# Patient Record
Sex: FEMALE
[De-identification: names, ages, dates, MRNs, and addresses within clinical notes are randomized per-mention and may not be internally consistent; named-entity substitution may affect disease eponyms.]

---

## 2023-10-03 ENCOUNTER — HOSPITAL ENCOUNTER (INPATIENT)
Dept: HOSPITAL 97 - ER | Age: 23
LOS: 5 days | Discharge: HOME | DRG: 386 | End: 2023-10-08
Attending: HOSPITALIST | Admitting: HOSPITALIST
Payer: COMMERCIAL

## 2023-10-03 VITALS — BODY MASS INDEX: 20.6 KG/M2

## 2023-10-03 DIAGNOSIS — D50.9: ICD-10-CM

## 2023-10-03 DIAGNOSIS — J45.909: ICD-10-CM

## 2023-10-03 DIAGNOSIS — G89.29: ICD-10-CM

## 2023-10-03 DIAGNOSIS — K51.511: Primary | ICD-10-CM

## 2023-10-03 DIAGNOSIS — M19.90: ICD-10-CM

## 2023-10-03 DIAGNOSIS — D62: ICD-10-CM

## 2023-10-03 DIAGNOSIS — K25.9: ICD-10-CM

## 2023-10-03 DIAGNOSIS — T39.395A: ICD-10-CM

## 2023-10-03 DIAGNOSIS — K44.9: ICD-10-CM

## 2023-10-03 DIAGNOSIS — E87.1: ICD-10-CM

## 2023-10-03 DIAGNOSIS — M06.9: ICD-10-CM

## 2023-10-03 DIAGNOSIS — M54.50: ICD-10-CM

## 2023-10-03 DIAGNOSIS — Z79.899: ICD-10-CM

## 2023-10-03 DIAGNOSIS — Z88.1: ICD-10-CM

## 2023-10-03 LAB
ALBUMIN SERPL BCP-MCNC: 3.2 G/DL (ref 3.4–5)
ALP SERPL-CCNC: 75 U/L (ref 45–117)
ALT SERPL W P-5'-P-CCNC: 18 U/L (ref 13–56)
AST SERPL W P-5'-P-CCNC: 10 U/L (ref 15–37)
BUN BLD-MCNC: 9 MG/DL (ref 7–18)
GLUCOSE SERPLBLD-MCNC: 90 MG/DL (ref 74–106)
HCT VFR BLD CALC: 33.8 % (ref 36–45)
LYMPHOCYTES # SPEC AUTO: 2 K/UL (ref 0.7–4.9)
MCV RBC: 79.9 FL (ref 80–100)
PMV BLD: 7.1 FL (ref 7.6–11.3)
POTASSIUM SERPL-SCNC: 4 MEQ/L (ref 3.5–5.1)
RBC # BLD: 4.24 M/UL (ref 3.86–4.86)
WBC # BLD AUTO: 14.4 THOU/UL (ref 4.3–10.9)

## 2023-10-03 PROCEDURE — 84100 ASSAY OF PHOSPHORUS: CPT

## 2023-10-03 PROCEDURE — 80053 COMPREHEN METABOLIC PANEL: CPT

## 2023-10-03 PROCEDURE — 83735 ASSAY OF MAGNESIUM: CPT

## 2023-10-03 PROCEDURE — 74250 X-RAY XM SM INT 1CNTRST STD: CPT

## 2023-10-03 PROCEDURE — 36415 COLL VENOUS BLD VENIPUNCTURE: CPT

## 2023-10-03 PROCEDURE — 81025 URINE PREGNANCY TEST: CPT

## 2023-10-03 PROCEDURE — 74177 CT ABD & PELVIS W/CONTRAST: CPT

## 2023-10-03 PROCEDURE — 80048 BASIC METABOLIC PNL TOTAL CA: CPT

## 2023-10-03 PROCEDURE — 84703 CHORIONIC GONADOTROPIN ASSAY: CPT

## 2023-10-03 PROCEDURE — 85025 COMPLETE CBC W/AUTO DIFF WBC: CPT

## 2023-10-03 PROCEDURE — 83690 ASSAY OF LIPASE: CPT

## 2023-10-03 PROCEDURE — 99285 EMERGENCY DEPT VISIT HI MDM: CPT

## 2023-10-03 PROCEDURE — 81001 URINALYSIS AUTO W/SCOPE: CPT

## 2023-10-03 NOTE — P.HP
Certification for Inpatient


Patient admitted to: Inpatient


With expected LOS: <2 Midnights


Patient will require the following post-hospital care: None


Practitioner: I am a practitioner with admitting privileges, knowledge of 

patient current condition, hospital course, and medical plan of care.


Services: Services provided to patient in accordance with Admission requirements

found in Title 42 Section 412.3 of the Code of Federal Regulations





Patient History


Date of Service: 10/03/23


Reason for admission: GI bleeding


History of Present Illness: 





23-year-old  female with a past medical history of rheumatoid arthritis,

osteoarthritis presents to the emergency room with rectal bleeding.  She reports

epigastric and lower quadrant abdominal tenderness.  That started 7 days ago.  

She reports stopping NSAID 4 days ago.  She reports associated bright red blood 

with each bowel movement over the last 3 days.  Reports mild nausea, low-grade 

fever today.  She denies chills, dysuria, vomiting,hematemesis,   She reported 

referred to the emergency room by her primary care doctor.   She reports taking 

meloxicam 15 mg, hydroxychloroquine. plan to admit for pancolitis, rectal 

bleeding.  Leukocytosis, rheumatoid arthritis, with GI consult.  Case was 

discussed with Dr. garzon.  Laboratory evaluation leukocytosis WBCs 14.40, no 

left shift, microcytic anemia hemoglobin 11.3, 33.8, platelets elevated at 410, 

mild hyponatremia 134, UA negative.  CT of the abdomen pelvis IMPRESSION: 

Moderately severe diffuse pancolitis is present. Although this appearance is 

nonspecific on CT, this may be inflammatory or infectious, ulcerative colitis is

a possibility.








Allergies





No Known Drug Allergies Allergy (Unverified 07/24/15 23:55)


   Unknown








- Past Medical/Surgical History


-: Rheumatoid arthritis


-: Osteoarthritis





- Social History


Smoking Status: Never smoker


Alcohol use: No


CD- Drugs: No


Caffeine use: No


Place of Residence: Home





Review of Systems


10-point ROS is otherwise unremarkable





Physical Examination





- Physical Exam


General: Alert, In no apparent distress, Oriented x3


HEENT: Atraumatic, Normocephalic, PERRLA


Neck: Supple, 2+ carotid pulse no bruit, JVD not distended


Respiratory: Clear to auscultation bilaterally, Normal air movement, Diminished


Cardiovascular: No edema, Normal pulses, Regular rate/rhythm


Capillary refill: <2 Seconds


Gastrointestinal: Normal bowel sounds, Other (Epigastric tenderness, lower 

abdominal tenderness, GI bleeding), Tenderness


Musculoskeletal: No clubbing, No swelling


Integumentary: No rashes, No breakdown


Neurological: Normal gait, Normal speech, Normal strength at 5/5 x4 extr





- Studies


Laboratory Data (last 24 hrs)











  10/03/23 10/03/23 10/03/23





  15:05 15:05 14:56


 


WBC   14.40 H 


 


Hgb   11.3 L 


 


Hct   33.8 L 


 


Plt Count   410 H 


 


Sodium  134 L  


 


Potassium  4.0  


 


BUN  9  


 


Creatinine  0.63  


 


Glucose  90  


 


Total Bilirubin  0.2  


 


AST  10 L  


 


ALT  18  


 


Alkaline Phosphatase  75  


 


Lipase    22











Assessment and Plan





- Plan


assessment plan


Pancolitis secondary to NSAID use


Gastric bleeding secondary to NSAID use versus pancolitis


Leukocytosis


Mild hyponatremia


Rheumatoid arthritis


Osteoarthritis


Microcytic anemia





assessment plan


Pancolitis secondary to NSAID use


Gastric bleeding secondary to NSAID use versus pancolitis


Leukocytosis


She reports starting new medication from rheumatology for RA. taking meloxicam 

15 mg, hydroxychloroquine. 


GI consult.  IV Flagyl, Cipro, as needed antiemetics, as needed analgesics


leukocytosis WBCs 14.40, no left shift, microcytic anemia hemoglobin 11.3, 33.8,

platelets elevated at 410, 


CT of the abdomen pelvis IMPRESSION: Moderately severe diffuse pancolitis is 

present. Although this appearance is nonspecific on CT, this may be inflammatory

or infectious, ulcerative colitis is a possibility.





Mild hyponatremia


mild hyponatremia 134, UA negative, IV fluids





Rheumatoid arthritis


Osteoarthritis


Microcytic anemia


Trend H&H, as needed analgesics, avoid NSAID use





Diet clear liquids


Full code


DVT SCDs.  


Discharge Plan: Home


Plan to discharge in: 48 Hours





- Advance Directives


Does patient have a Living Will: No


Does patient have a Durable POA for Healthcare: No





- Code Status/Comfort Care


Code Status: Full Code


Physician Review: Patient Assessed, Agree with Above Assessment and Plan


Critical Care: No


Time Spent Managing Pts Care (In Minutes): 50

## 2023-10-03 NOTE — P.HP
Certification for Inpatient


Patient admitted to: Inpatient


With expected LOS: <2 Midnights


Patient will require the following post-hospital care: None


Practitioner: I am a practitioner with admitting privileges, knowledge of 

patient current condition, hospital course, and medical plan of care.


Services: Services provided to patient in accordance with Admission requirements

found in Title 42 Section 412.3 of the Code of Federal Regulations





Patient History


Date of Service: 10/03/23


History of Present Illness: 





23-year-old  female with a past medical history of rheumatoid arthritis,

osteoarthritis presents to the emergency room with rectal bleeding.  She reports

lower quadrant abdominal pain that started 7 days ago.  She reports associated 

bright red blood with each bowel movement.  She reports starting new medication 

from rheumatology for RA.  She reports taking meloxicam 15 mg, 

hydroxychloroquine.  She denies fever, nausea vomiting,hematemesis, plan to 

admit for pancolitis, rectal bleeding.  Leukocytosis, rheumatoid arthritis, with

GI consult.  Case was discussed with Dr. garzon.  Laboratory evaluation 

leukocytosis WBCs 14.40, no left shift, microcytic anemia hemoglobin 11.3, 33.8,

platelets elevated at 410, mild hyponatremia 134, UA negative.  CT of the 

abdomen pelvis IMPRESSION: Moderately severe diffuse pancolitis is present. 

Although this appearance is nonspecific on CT, this may be inflammatory or 

infectious, ulcerative colitis is a possibility.








Allergies





No Known Drug Allergies Allergy (Unverified 07/24/15 23:55)


   Unknown








- Past Medical/Surgical History


-: Rheumatoid arthritis


-: Osteoarthritis





Physical Examination





- Studies


Laboratory Data (last 24 hrs)











  10/03/23 10/03/23 10/03/23





  16:45 16:12 16:12


 


WBC   


 


RBC   


 


Hgb   


 


Hct   


 


MCV   


 


MCH   


 


MCHC   


 


RDW   


 


Plt Count   


 


MPV   


 


Neutrophils %   


 


Lymphocytes %   


 


Monocytes %   


 


Eosinophils %   


 


Basophils %   


 


Absolute Neutrophils   


 


Absolute Lymphocytes   


 


Absolute Monocytes   


 


Absolute Eosinophils   


 


Absolute Basophils   


 


Sodium   


 


Potassium   


 


Chloride   


 


Carbon Dioxide   


 


Anion Gap   


 


BUN   


 


Creatinine   


 


Est GFR (CKD-EPI)   


 


Glucose   


 


Calcium   


 


Total Bilirubin   


 


AST   


 


ALT   


 


Alkaline Phosphatase   


 


Serum Total Protein   


 


Albumin   


 


Globulin   


 


Albumin/Globulin Ratio   


 


Lipase   


 


Serum Pregnancy, Qual  Neg  


 


Urine Color    Colorless


 


Urine Clarity    Turbid H


 


Urine pH    6.0


 


Ur Specific Gravity   < 1.005 L  < 1.005 L


 


Glucose (UA)(Auto)    Negative


 


Urine Ketones    Trace H


 


Urine Blood    Negative


 


Urine Nitrite    Negative


 


Urine Bilirubin    Negative


 


Urine Urobilinogen    Normal


 


Ur Leukocyte Esterase    Negative


 


Urine RBC    <5


 


Urine WBC    <5


 


Ur Squamous Epith Cells    <5


 


Unidentified Crystals    Few


 


Urine Bacteria    <20


 


Urine Culture Reflexed    Not needed


 


Urine Total Protein    Negative


 


Urine Pregnancy Test   Neg 














  10/03/23 10/03/23 10/03/23





  15:05 15:05 14:56


 


WBC   14.40 H 


 


RBC   4.24 


 


Hgb   11.3 L 


 


Hct   33.8 L 


 


MCV   79.9 L 


 


MCH   26.8 L 


 


MCHC   33.5 


 


RDW   14.2 


 


Plt Count   410 H 


 


MPV   7.1 L 


 


Neutrophils %   71.5 


 


Lymphocytes %   14.3 L 


 


Monocytes %   12.3 


 


Eosinophils %   1.7 


 


Basophils %   0.2 


 


Absolute Neutrophils   10.3 H 


 


Absolute Lymphocytes   2.0 


 


Absolute Monocytes   1.8 H 


 


Absolute Eosinophils   0.2 


 


Absolute Basophils   0.0 


 


Sodium  134 L  


 


Potassium  4.0  


 


Chloride  105  


 


Carbon Dioxide  27  


 


Anion Gap  6.0  


 


BUN  9  


 


Creatinine  0.63  


 


Est GFR (CKD-EPI)  126  


 


Glucose  90  


 


Calcium  8.9  


 


Total Bilirubin  0.2  


 


AST  10 L  


 


ALT  18  


 


Alkaline Phosphatase  75  


 


Serum Total Protein  8.0  


 


Albumin  3.2 L  


 


Globulin  4.8 H  


 


Albumin/Globulin Ratio  0.7 L  


 


Lipase    22


 


Serum Pregnancy, Qual   


 


Urine Color   


 


Urine Clarity   


 


Urine pH   


 


Ur Specific Gravity   


 


Glucose (UA)(Auto)   


 


Urine Ketones   


 


Urine Blood   


 


Urine Nitrite   


 


Urine Bilirubin   


 


Urine Urobilinogen   


 


Ur Leukocyte Esterase   


 


Urine RBC   


 


Urine WBC   


 


Ur Squamous Epith Cells   


 


Unidentified Crystals   


 


Urine Bacteria   


 


Urine Culture Reflexed   


 


Urine Total Protein   


 


Urine Pregnancy Test   











Assessment and Plan





- Plan








She reports starting new medication from rheumatology for RA.  She reports ta

elly meloxicam 15 mg, hydroxychloroquine.  She denies fever, nausea 

vomiting,hematemesis, plan to admit for pancolitis, rectal bleeding.  

Leukocytosis, rheumatoid arthritis, with GI consult.  Case was discussed with 

Dr. garzon.  Laboratory evaluation leukocytosis WBCs 14.40, no left shift, 

microcytic anemia hemoglobin 11.3, 33.8, platelets elevated at 410, mild 

hyponatremia 134, UA negative.  CT of the abdomen pelvis IMPRESSION: Moderately 

severe diffuse pancolitis is present. Although this appearance is nonspecific on

CT, this may be inflammatory or infectious, ulcerative colitis is a possibility.


He


Discharge Plan: Home


Plan to discharge in: 48 Hours





- Advance Directives


Does patient have a Living Will: No


Does patient have a Durable POA for Healthcare: No





- Code Status/Comfort Care


Code Status: Full Code


Physician Review: Patient Assessed, Agree with Above Assessment and Plan


Critical Care: No


Time Spent Managing Pts Care (In Minutes): 50

## 2023-10-03 NOTE — RAD REPORT
EXAM DESCRIPTION:  CTAbdomen   Pelvis W Contrast - 10/3/2023 6:08 pm

 

CLINICAL HISTORY:  Abdominal pain.

ABD PAIN

 

COMPARISON:  No comparisons

 

TECHNIQUE:  Biphasic CT imaging of the abdomen and pelvis was performed with 100 ml non-ionic IV cont
rast.

 

All CT scans are performed using dose optimization technique as appropriate and may include automated
 exposure control or mA/KV adjustment according to patient size.

 

FINDINGS:  The lung bases are clear.

 

The liver, spleen, pancreas, adrenal glands and kidneys are within normal limits.

 

No bowel obstruction, free air, free fluid or abscess.  There is a moderate to significantly thickene
d appearance to the colon throughout as well as the appendix likely representing colitis. No pneumato
sis.  Mildly prominent lymph nodes in the mesocolon presumably reactive in nature.

 

No suspicious bony findings.

 

IMPRESSION:  Moderately severe diffuse pancolitis is present. Although this appearance is nonspecific
 on CT, this may be inflammatory or infectious, ulcerative colitis is a possibility.

## 2023-10-03 NOTE — ER
Nurse's Notes                                                                                     

 [facility]                                                                 

Name: Perez Treadwell                                                                         

Age: 23 yrs                                                                                       

Sex: Female                                                                                       

: 2000                                                                                   

MRN: [id number]                                                                                   

Arrival Date: 10/03/2023                                                                          

Time: 14:42                                                                                       

Account#: [id number]                                                                            

Bed 19                                                                                            

Private MD:                                                                                       

Diagnosis: Left sided colitis with rectal bleeding;Abdominal pain, Generalized;GI Bleed/          

  Gastrointestinal hemorrhage, unspecified-LOWER;Elevated white blood cell                        

  count;Rheumatoid arthritis, unspecified;Fever, unspecified                                      

                                                                                                  

Presentation:                                                                                     

10/03                                                                                             

14:50 Chief complaint: Patient states: "I just recently got diagnosed with RA about 2-3 weeks aa5 

      ago and I had been having diarrhea since I started the medications but about a week ago     

      I started having blood in my stool". Pt also c/o upper abd pain. Pt reports nausea,         

      denies vomiting.                                                                            

14:50 Coronavirus screen: nausea. Ebola Screen: Patient denies travel to an Ebola-affected    [facility] 

      area in the 21 days before illness onset. Initial Sepsis Screen: Does the patient meet      

      any 2 criteria? HR > 90 bpm. Does the patient have a suspected source of infection? No.     

      Patient's initial sepsis screen is negative. Risk Assessment: Do you want to hurt           

      yourself or someone else? Patient reports no desire to harm self or others. Onset of        

      symptoms was 2023.                                                                

14:50 Acuity: MALENA 3                                                                           aa5 

14:50 Method Of Arrival: Ambulatory                                                           aa5 

                                                                                                  

OB/GYN:                                                                                           

15:00 LMP N/A - Birth control method, Not pregnant                                            aa5 

                                                                                                  

Historical:                                                                                       

- Allergies:                                                                                      

17:35 Ciprofloxacin (Hives);                                                                  cm10

- Home Meds:                                                                                      

14:50 meloxicam 15 mg oral tablet [Active]; hydroxychloroquine 200 mg oral tablet [Active];   aa5 

- PMHx:                                                                                           

14:59 RA; Osteoarthritis; seasonal allergies;                                                 aa5 

- PSHx:                                                                                           

14:59 None;                                                                                   aa5 

                                                                                                  

- Immunization history:: Adult Immunizations unknown.                                             

- Social history:: Smoking status: Patient denies any tobacco usage or history of.                

- Family history:: not pertinent.                                                                 

                                                                                                  

                                                                                                  

Screening:                                                                                        

15:39 [facility] ED Fall Risk Assessment (Adult) History of falling in the last 3 months,       me1 

      including since admission No falls in past 3 months (0 pts) Confusion or Disorientation     

      No (0 pts) Intoxicated or Sedated No (0 pts) Impaired Gait No (0 pts) Mobility Assist       

      Device Used No (0 pt) Altered Elimination No (0 pt) Score/Fall Risk Level 0 - 2 = Low       

      Risk. Abuse screen: Denies threats or abuse. Nutritional screening: No deficits noted.      

      Tuberculosis screening: No symptoms or risk factors identified.                             

                                                                                                  

Assessment:                                                                                       

15:39 General: Appears uncomfortable, well groomed, well developed, well nourished, Behavior  me1 

      is calm, cooperative, appropriate for age, Reports feeling ill for recent dx of RA and      

      started the medications about 2-3 weeks ago. Reports she has had diarrhea since             

      starting those medications and started having bloody stool about one week ago. c/o          

      epigastric pain that is squeezing/cramping 8/10. Pain: Complains of pain in epigastric      

      area Pain does not radiate. Pain currently is 8 out of 10 on a pain scale. Quality of       

      pain is described as crampy, squeezing, Pain began gradually, Is continuous. Neuro:         

      Level of Consciousness is awake, alert, obeys commands, Oriented to person, place,          

      time, situation, Appropriate for age. Cardiovascular: Capillary refill < 3 seconds          

      Patient's skin is warm and dry. Respiratory: Airway is patent Respiratory effort is         

      even, unlabored, Respiratory pattern is regular, symmetrical. GI: Reports upper             

      abdominal pain, diarrhea, rectal bleeding, nausea, vomiting, since 2-3 weeks.               

17:29 Reassessment: Pt noted to have allergic reaction to cipro ABX. Provider made aware and  cm10

      pt medicated per MAR.                                                                       

17:51 Reassessment: Pt's hives noted to be decreased. Pt reports that she is no longer itchy. cm10

18:00 Reassessment: Patient appears in no apparent distress at this time. Patient and/or      cm10

      family updated on plan of care and expected duration. Pain level reassessed. Patient is     

      alert, oriented x 3, equal unlabored respirations, skin warm/dry/pink.                      

19:18 Reassessment: ASSUMED CARE OF PT. PT LYING IN BED. HOSPITALIST AT BEDSIDE SPEAKING TO   patricia 

      PT AND HER MOTHER. NO DISTRESS NOTED VS STABLE. WATER PROVIDED.                             

21:30 Reassessment: PT LYING IN BED. JELLO AND SODA GIVEN TO PT. PT TOLERATING WELL. MOTHER   jalexandra 

      AT BEDSIDE.                                                                                 

                                                                                                  

Vital Signs:                                                                                      

14:50  / 95; Pulse 122; Resp 20 S; Temp 99.5(O); Pulse Ox 100% on R/A; Weight 51.26 kg  aa5 

      (R); Height 5 ft. 2 in. (R);                                                                

15:19  / 93; Pulse 120; Resp 16; Pulse Ox 100% ;                                        cm10

16:22  / 73; Pulse 102; Resp 16; Pulse Ox 100% on R/A;                                  cm10

16:22  / 78; Pulse 101; Resp 16; Pulse Ox 98% ;                                         cm10

16:40 Pain 3/10;                                                                              cm10

16:40 Pain 3/10;                                                                              cm10

19:18  / 69; Pulse 88; Resp 17; Pulse Ox 98% ; Pain 0/10;                               jj7 

20:00  / 71; Pulse 87; Resp 17; Pulse Ox 98% ; Pain 0/10;                               jj7 

21:00 BP 94 / 59; Pulse 79; Resp 18; Pulse Ox 99% ; Pain 0/10;                                jj7 

22:00  / 68; Pulse 83; Resp 17; Pulse Ox 99% ; Pain 0/10;                               jj7 

14:50 Body Mass Index 20.67 (51.26 kg, 157.48 cm)                                             aa5 

16:40 Pain Scale: Adult                                                                       cm10

16:40 Pain Scale: Adult                                                                       cm10

19:18 Pain Scale: Adult                                                                       jj7 

20:00 Pain Scale: Adult                                                                       jj7 

21:00 Pain Scale: Adult                                                                       jj7 

22:00 Pain Scale: Adult                                                                       jj7 

                                                                                                  

ED Course:                                                                                        

14:46 Patient arrived in ED.                                                                  mg5 

14:49 Keyur Ferrari MD is Attending Physician.                                             taylor 

14:50 Arm band placed on Patient placed in an exam room, on a stretcher.                      aa5 

14:53 Estefanía Coy, RN is Primary Nurse.                                                me1 

15:03 Triage completed.                                                                       aa5 

15:11 Initial lab(s) drawn, by me, sent to lab. Inserted saline lock: 22 gauge in left        tm3 

      forearm, using aseptic technique.                                                           

15:39 Patient has correct armband on for positive identification. Bed in low position. Call   me1 

      light in reach. Side rails up X 1. Provided Education on: POC. Verbalized                   

      understanding. .                                                                            

15:39 No provider procedures requiring assistance completed.                                  me1 

16:05 Inserted saline lock: 20 gauge in right antecubital area, using aseptic technique. IV   cm10

      is reddened, is swollen, IV removed at this time. . IV discontinued, intact, bleeding       

      controlled, Pressure dressing applied.                                                      

16:35 Tommie Kebede is Hospitalizing Provider.                                               taylor 

16:46 Pregnancy Test, Serum Sent.                                                             cm10

18:10 CT Abd/Pelvis - IV Contrast Only In Process Unspecified.                                EDMS

                                                                                                  

Administered Medications:                                                                         

17:16 Discontinued: [id number] mg 200 ml IVPB once over 60 mins                         taylor 

15:25 Drug: NS 0.9% IV 1000 ml IV at 1 bolus Per protocol; 1000 mL bolus Route: IV; Rate: 1   me1 

      bolus; Site: left antecubital;                                                              

15:37 Drug: Ondansetron IVP 4 mg IVP once; over 2 minutes Route: IVP; Site: left antecubital; me1 

16:40 Follow up: Response: No adverse reaction                                                cm10

15:37 Drug: metroNIDAZOLE IVPB 500 mg 100 ml IVPB at 200 ml/hr once over 30 mins Volume: 100  me1 

      ml; Route: IVPB; Rate: 200 ml/hr; Infused Over: 30 mins; Site: left antecubital;            

16:32 Follow up: Response: No adverse reaction; IV Status: Completed infusion; IV Intake:     cm10

      100ml                                                                                       

15:37 Drug: Acetaminophen  mg PO once Route: PO;                                        me1 

16:40 Follow up: Pain 3/10 Adult; Response: No adverse reaction; Pain is decreased            cm10

15:38 Drug: morphine IVP or IV 4 mg IVP once over 4 mins Route: IVP; Infused Over: 4 mins;    me1 

      Site: left antecubital;                                                                     

16:40 Follow up: Pain 3/10 Adult; Response: No adverse reaction; Pain is decreased            cm10

16:32 Drug: Ciprofloxacin IVPB 400 mg 200 ml IVPB once over 60 mins Volume: 200 ml; Route:    cm10

      IVPB; Infused Over: 60 mins; Site: right antecubital;                                       

17:29 Follow up: Response: Adverse reaction, Physician notified; Adverse reaction, Physician  cm10

      notified, Pt noted to have hives.                                                           

17:28 Drug: Famotidine IVP 20 mg IVP once; dilute with 10 mL 0.9% NaCl; give over 2 minutes   cm10

      Route: IVP; Site: right antecubital;                                                        

17:51 Follow up: Response: No adverse reaction                                                cm10

17:28 Drug: MethylPrednisoLONE  mg IVP once Route: IVP; Site: right antecubital;       cm10

17:51 Follow up: Response: No adverse reaction                                                cm10

17:29 Drug: diphenhydrAMINE IVP 50 mg IVP once Route: IVP; Site: right antecubital;           cm10

17:51 Follow up: Response: No adverse reaction                                                cm10

18:29 Drug: Rocephin IV 1 grams IV at per protocol once; Given slow IV push per pharmacy      cm10

      instructions Route: IV; Rate: per protocol; Site: right antecubital;                        

19:03 Follow up: Response: No adverse reaction                                                cm10

                                                                                                  

                                                                                                  

Medication:                                                                                       

15:39 VIS not applicable for this client.                                                     me1 

                                                                                                  

Intake:                                                                                           

16:32 IV: 100ml; Total: 100ml.                                                                cm10

                                                                                                  

Outcome:                                                                                          

16:37 Decision to Hospitalize by Provider.                                                    taylor 

22:19 Admitted to Med/surg accompanied by tech, via stretcher, room 408, with chart, Report   jj7 

      called to  KYARA GAMEZ                                                                       

22:19 Condition: improved                                                                         

22:32 Patient left the ED.                                                                    jj7 

                                                                                                  

Signatures:                                                                                       

Dispatcher [employer]                           EDShakir Foley                                tm3                                                  

Keyur Ferrari MD MD cha Calderon, Audri, RN                     RN   aa5                                                  

Renetta Woods RN                 RN   jEsme Angel RN                  RN   cm10                                                 

Estefanía Coy RN                  RN   me1                                                  

Dinora Nunes                             mg5                                                  

                                                                                                  

Corrections: (The following items were deleted from the chart)                                    

17:35 14:59 Allergies: No Known Allergies; aa5                                                cm10

                                                                                                  

**************************************************************************************************

## 2023-10-03 NOTE — EDPHYS
Physician Documentation                                                                           

 [facility]                                                                 

Name: Perez Treadwell                                                                         

Age: 23 yrs                                                                                       

Sex: Female                                                                                       

: 2000                                                                                   

MRN: [id number]                                                                                   

Arrival Date: 10/03/2023                                                                          

Time: 14:42                                                                                       

Account#: [id number]                                                                            

Bed 19                                                                                            

Private MD:                                                                                       

ED Physician Keyur Ferrari                                                                      

HPI:                                                                                              

10/03                                                                                             

15:33 This 23 yrs old  Female presents to ER via Ambulatory with complaints of Rectal taylor 

      Bleeding.                                                                                   

15:33 The patient presents with abdominal pain in the upper abdomen, in the lower abdomen.    taylor 

      Onset: The symptoms/episode began/occurred 7 day(s) ago. The patient presents to the        

      emergency department with rectal bleeding, bright red blood with bowel movement, in         

      toilet bowl. Onset: The symptoms/episode began/occurred 7 day(s) ago. Abdominal pain:       

      located in the right upper quadrant, left upper quadrant, right lower quadrant and left     

      lower quadrant. Modifying factors: The symptoms are alleviated by nothing, the symptoms     

      are aggravated by nothing. Associated signs and symptoms: The patient has no apparent       

      associated signs or symptoms. Modifying factors: The symptoms are alleviated by             

      nothing, the symptoms are aggravated by nothing.                                            

                                                                                                  

OB/GYN:                                                                                           

15:00 LMP N/A - Birth control method, Not pregnant                                            aa5 

                                                                                                  

Historical:                                                                                       

- Allergies:                                                                                      

17:35 Ciprofloxacin (Hives);                                                                  cm10

- Home Meds:                                                                                      

14:50 meloxicam 15 mg oral tablet [Active]; hydroxychloroquine 200 mg oral tablet [Active];   aa5 

- PMHx:                                                                                           

14:59 RA; Osteoarthritis; seasonal allergies;                                                 aa5 

- PSHx:                                                                                           

14:59 None;                                                                                   aa5 

                                                                                                  

- Immunization history:: Adult Immunizations unknown.                                             

- Social history:: Smoking status: Patient denies any tobacco usage or history of.                

- Family history:: not pertinent.                                                                 

                                                                                                  

                                                                                                  

ROS:                                                                                              

15:33 Constitutional: Negative for fever, chills, and weight loss, Eyes: Negative for injury, taylor 

      pain, redness, and discharge, ENT: Negative for injury, pain, and discharge, Neck:          

      Negative for injury, pain, and swelling, Cardiovascular: Negative for chest pain,           

      palpitations, and edema, Respiratory: Negative for shortness of breath, cough,              

      wheezing, and pleuritic chest pain, Back: Negative for injury and pain, : Negative        

      for injury, bleeding, discharge, and swelling, MS/Extremity: Negative for injury and        

      deformity, Skin: Negative for injury, rash, and discoloration, Neuro: Negative for          

      headache, weakness, numbness, tingling, and seizure, Psych: Negative for depression,        

      anxiety, suicide ideation, homicidal ideation, and hallucinations, Allergy/Immunology:      

      Negative for hives, rash, and allergies, Endocrine: Negative for neck swelling,             

      polydipsia, polyuria, polyphagia, and marked weight changes,                                

15:33 Abdomen/GI: Positive for abdominal pain, abdominal cramps, rectal bleeding,                 

                                                                                                  

Exam:                                                                                             

15:33 Constitutional:  This is a well developed, well nourished patient who is awake, alert,  taylor 

      and in no acute distress. Head/Face:  Normocephalic, atraumatic. Eyes:  Pupils equal        

      round and reactive to light, extra-ocular motions intact.  Lids and lashes normal.          

      Conjunctiva and sclera are non-icteric and not injected.  Cornea within normal limits.      

      Periorbital areas with no swelling, redness, or edema. ENT:  Nares patent. No nasal         

      discharge, no septal abnormalities noted.  Tympanic membranes are normal and external       

      auditory canals are clear.  Oropharynx with no redness, swelling, or masses, exudates,      

      or evidence of obstruction, uvula midline.  Mucous membranes moist. Neck:  Trachea          

      midline, no thyromegaly or masses palpated, and no cervical lymphadenopathy.  Supple,       

      full range of motion without nuchal rigidity, or vertebral point tenderness.  No            

      Meningismus. Chest/axilla:  Normal chest wall appearance and motion.  Nontender with no     

      deformity.  No lesions are appreciated. Respiratory:  Lungs have equal breath sounds        

      bilaterally, clear to auscultation and percussion.  No rales, rhonchi or wheezes noted.     

       No increased work of breathing, no retractions or nasal flaring. Back:  No spinal          

      tenderness.  No costovertebral tenderness.  Full range of motion. Female :  Normal        

      external genitalia. Skin:  Warm, dry with normal turgor.  Normal color with no rashes,      

      no lesions, and no evidence of cellulitis. MS/ Extremity:  Pulses equal, no cyanosis.       

      Neurovascular intact.  Full, normal range of motion. Neuro:  Awake and alert, GCS 15,       

      oriented to person, place, time, and situation.  Cranial nerves II-XII grossly intact.      

      Motor strength 5/5 in all extremities.  Sensory grossly intact.  Cerebellar exam            

      normal.  Normal gait. Psych:  Awake, alert, with orientation to person, place and time.     

       Behavior, mood, and affect are within normal limits.                                       

15:33 Cardiovascular: Rate: tachycardic, actual rate is  122 bpm, Rhythm: regular, Pulses:        

      Pulses are 4+ in bilateral radial, brachial, femoral, popliteal, posterior tibial and       

      and dorsalis pedis arteries.. Heart sounds: normal, Edema: is not appreciated, JVD: is      

      not appreciated,                                                                            

15:33 Abdomen/GI: Inspection: abdomen appears normal, Bowel sounds: normal, Palpation: mild       

      abdominal tenderness, in all quadrants, Rectal exam: is unremarkable, rectal tone           

      hemorrhoid(s), are not appreciated, mass, is not appreciated, swelling, is not              

      appreciated, tenderness, is not appreciated, fecal impaction, is not appreciated,           

      Liver: no appreciated palpable abnormalities, Hernia: not appreciated,                      

                                                                                                  

Vital Signs:                                                                                      

14:50  / 95; Pulse 122; Resp 20 S; Temp 99.5(O); Pulse Ox 100% on R/A; Weight 51.26 kg  aa5 

      (R); Height 5 ft. 2 in. (R);                                                                

15:19  / 93; Pulse 120; Resp 16; Pulse Ox 100% ;                                        cm10

16:22  / 73; Pulse 102; Resp 16; Pulse Ox 100% on R/A;                                  cm10

16:22  / 78; Pulse 101; Resp 16; Pulse Ox 98% ;                                         cm10

16:40 Pain 3/10;                                                                              cm10

16:40 Pain 3/10;                                                                              cm10

19:18  / 69; Pulse 88; Resp 17; Pulse Ox 98% ; Pain 0/10;                               jj7 

20:00  / 71; Pulse 87; Resp 17; Pulse Ox 98% ; Pain 0/10;                               jj7 

21:00 BP 94 / 59; Pulse 79; Resp 18; Pulse Ox 99% ; Pain 0/10;                                jj7 

22:00  / 68; Pulse 83; Resp 17; Pulse Ox 99% ; Pain 0/10;                               jj7 

14:50 Body Mass Index 20.67 (51.26 kg, 157.48 cm)                                             aa5 

16:40 Pain Scale: Adult                                                                       cm10

16:40 Pain Scale: Adult                                                                       cm10

19:18 Pain Scale: Adult                                                                       jj7 

20:00 Pain Scale: Adult                                                                       jj7 

21:00 Pain Scale: Adult                                                                       jj7 

22:00 Pain Scale: Adult                                                                       jj7 

                                                                                                  

MDM:                                                                                              

14:49 Patient medically screened.                                                             taylor 

15:37 Differential diagnosis: gastritis, diverticulitis, hemorrhoids, hemorrhagic shock,      taylor 

      bowel obstruction, Cholelithiasis, diverticulitis, Endometriosis, gastritis, GI Bleed,      

      Mesenteric ischemia or infarction, myocardia ischemia or infarction, non-specific abd       

      pain, pancreatitis, Peptic Ulcer Disease, Perf. Duodenal Ulcer, urinary tract               

      infection. Data reviewed: vital signs, nurses notes, lab test result(s), radiologic         

      studies, CT scan. Consideration of Admission/Observation Escalation of care including       

      admission/observation considered. I considered the following discharge prescriptions or     

      medication management in the emergency department Medications were administered in the      

      Emergency Department. See MAR. Test considered but Not performed: Ultrasound no abd         

      usg. Care significantly affected by the following chronic conditions: RA,                   

      OSTEOARTHRITIS, ALLERGIES.                                                                  

                                                                                                  

10/03                                                                                             

14:55 Order name: CBC with Diff; Complete Time: 16:15                                         [facility] 

10/03                                                                                             

14:55 Order name: Comprehensive Metabolic Panel; Complete Time: 16:15                         [facility] 

10/03                                                                                             

14:55 Order name: Urinalysis w/ reflexes; Complete Time: 16:33                                [facility] 

10/03                                                                                             

14:55 Order name: PREGU; Complete Time: 16:33                                                 [facility] 

10/03                                                                                             

15:26 Order name: Lipase; Complete Time: 16:15                                                [facility] 

10/03                                                                                             

16:32 Order name: Pregnancy Test, Serum; Complete Time: 17:58                                 [facility] 

10/03                                                                                             

15:23 Order name: CT Abd/Pelvis - IV Contrast Only                                            [facility] 

10/03                                                                                             

18:46 Order name: CONS Physician Consult                                                      EDMS

                                                                                                  

Administered Medications:                                                                         

17:16 Discontinued: [id number] mg 200 ml IVPB once over 60 mins                         taylor 

15:25 Drug: NS 0.9% IV 1000 ml IV at 1 bolus Per protocol; 1000 mL bolus Route: IV; Rate: 1   me1 

      bolus; Site: left antecubital;                                                              

15:37 Drug: Ondansetron IVP 4 mg IVP once; over 2 minutes Route: IVP; Site: left antecubital; me1 

16:40 Follow up: Response: No adverse reaction                                                cm10

15:37 Drug: metroNIDAZOLE IVPB 500 mg 100 ml IVPB at 200 ml/hr once over 30 mins Volume: 100  me1 

      ml; Route: IVPB; Rate: 200 ml/hr; Infused Over: 30 mins; Site: left antecubital;            

16:32 Follow up: Response: No adverse reaction; IV Status: Completed infusion; IV Intake:     cm10

      100ml                                                                                       

15:37 Drug: Acetaminophen  mg PO once Route: PO;                                        me1 

16:40 Follow up: Pain 3/10 Adult; Response: No adverse reaction; Pain is decreased            cm10

15:38 Drug: morphine IVP or IV 4 mg IVP once over 4 mins Route: IVP; Infused Over: 4 mins;    me1 

      Site: left antecubital;                                                                     

16:40 Follow up: Pain 3/10 Adult; Response: No adverse reaction; Pain is decreased            cm10

16:32 Drug: Ciprofloxacin IVPB 400 mg 200 ml IVPB once over 60 mins Volume: 200 ml; Route:    cm10

      IVPB; Infused Over: 60 mins; Site: right antecubital;                                       

17:29 Follow up: Response: Adverse reaction, Physician notified; Adverse reaction, Physician  cm10

      notified, Pt noted to have hives.                                                           

17:28 Drug: Famotidine IVP 20 mg IVP once; dilute with 10 mL 0.9% NaCl; give over 2 minutes   cm10

      Route: IVP; Site: right antecubital;                                                        

17:51 Follow up: Response: No adverse reaction                                                cm10

17:28 Drug: MethylPrednisoLONE  mg IVP once Route: IVP; Site: right antecubital;       cm10

17:51 Follow up: Response: No adverse reaction                                                cm10

17:29 Drug: diphenhydrAMINE IVP 50 mg IVP once Route: IVP; Site: right antecubital;           cm10

17:51 Follow up: Response: No adverse reaction                                                cm10

18:29 Drug: Rocephin IV 1 grams IV at per protocol once; Given slow IV push per pharmacy      cm10

      instructions Route: IV; Rate: per protocol; Site: right antecubital;                        

19:03 Follow up: Response: No adverse reaction                                                cm10

                                                                                                  

                                                                                                  

Disposition Summary:                                                                              

10/03/23 16:37                                                                                    

Hospitalization Ordered                                                                           

 Notes:       Hospitalization Status: Observation                                                   
  taylor

      Provider: Tommie Kebede cha 

      Location: Tele[facility] (observation)                                               taylor 

      Condition: Stable                                                                       taylor 

      Problem: new                                                                            taylor 

      Symptoms: have improved                                                                 taylor 

      Bed/Room Type: Standard                                                                 taylor 

      Room Assignment: 408(10/03/23 21:27)                                                    cg  

      Diagnosis                                                                                   

        - Left sided colitis with rectal bleeding                                             taylor 

        - Abdominal pain, Generalized                                                         taylor 

        - GI Bleed/ Gastrointestinal hemorrhage, unspecified - LOWER                          taylor 

        - Elevated white blood cell count                                                     taylor 

        - Rheumatoid arthritis, unspecified                                                   taylor 

        - Fever, unspecified                                                                  taylor 

      Forms:                                                                                      

        - Medication Reconciliation Form                                                      taylor 

        - SBAR form                                                                           taylor 

        - Leadership Thank You Letter                                                         taylor 

Signatures:                                                                                       

Dispatcher [employer]                           Keyur Smart MD MD cha Calderon, Audri RN                     RN   5                                                  

Marina Kelly RN                       RN                                                      

Esme Pemberton RN RN   10                                                 

Estefanía Coy RN                  RN   me1                                                  

                                                                                                  

Corrections: (The following items were deleted from the chart)                                    

17:35 14:59 Allergies: No Known Allergies; aa5                                                cm10

21:03 16:37 taylor                                                                               10

21:27 21:03 [address] [place] [address]  

                                                                                                  

**************************************************************************************************

## 2023-10-04 LAB
ALBUMIN SERPL BCP-MCNC: 2.5 G/DL (ref 3.4–5)
ALP SERPL-CCNC: 62 U/L (ref 45–117)
ALT SERPL W P-5'-P-CCNC: 15 U/L (ref 13–56)
AST SERPL W P-5'-P-CCNC: 6 U/L (ref 15–37)
BUN BLD-MCNC: 5 MG/DL (ref 7–18)
GLUCOSE SERPLBLD-MCNC: 141 MG/DL (ref 74–106)
HCT VFR BLD CALC: 29.7 % (ref 36–45)
HYPOCHROMIA BLD QL: (no result)
LYMPHOCYTES # SPEC AUTO: 0.8 K/UL (ref 0.7–4.9)
MAGNESIUM SERPL-MCNC: 2.3 MG/DL (ref 1.6–2.4)
MCV RBC: 79.8 FL (ref 80–100)
MORPHOLOGY BLD-IMP: (no result)
PMV BLD: 7.3 FL (ref 7.6–11.3)
POTASSIUM SERPL-SCNC: 4.3 MEQ/L (ref 3.5–5.1)
RBC # BLD: 3.73 M/UL (ref 3.86–4.86)
WBC # BLD AUTO: 8.9 THOU/UL (ref 4.3–10.9)

## 2023-10-04 RX ADMIN — ACETAMINOPHEN PRN MG: 500 TABLET, FILM COATED ORAL at 04:43

## 2023-10-04 RX ADMIN — ONDANSETRON PRN MG: 2 INJECTION INTRAMUSCULAR; INTRAVENOUS at 04:43

## 2023-10-04 RX ADMIN — SODIUM CHLORIDE SCH MLS: 0.9 INJECTION, SOLUTION INTRAVENOUS at 12:31

## 2023-10-04 RX ADMIN — Medication SCH ML: at 08:58

## 2023-10-04 RX ADMIN — METRONIDAZOLE SCH MLS: 500 INJECTION, SOLUTION INTRAVENOUS at 08:58

## 2023-10-04 RX ADMIN — Medication SCH ML: at 00:38

## 2023-10-04 RX ADMIN — ACETAMINOPHEN PRN MG: 500 TABLET, FILM COATED ORAL at 00:37

## 2023-10-04 RX ADMIN — SODIUM CHLORIDE SCH MG: 0.9 INJECTION, SOLUTION INTRAVENOUS at 00:38

## 2023-10-04 RX ADMIN — Medication SCH ML: at 19:52

## 2023-10-04 RX ADMIN — METRONIDAZOLE SCH MLS: 500 INJECTION, SOLUTION INTRAVENOUS at 01:05

## 2023-10-04 RX ADMIN — ACETAMINOPHEN PRN MG: 500 TABLET, FILM COATED ORAL at 16:07

## 2023-10-04 RX ADMIN — SODIUM CHLORIDE SCH MG: 0.9 INJECTION, SOLUTION INTRAVENOUS at 08:58

## 2023-10-04 RX ADMIN — ACETAMINOPHEN PRN MG: 500 TABLET, FILM COATED ORAL at 19:51

## 2023-10-04 RX ADMIN — ONDANSETRON PRN MG: 2 INJECTION INTRAMUSCULAR; INTRAVENOUS at 19:51

## 2023-10-04 RX ADMIN — CEFTRIAXONE SCH MLS: 1 INJECTION, POWDER, FOR SOLUTION INTRAMUSCULAR; INTRAVENOUS at 16:00

## 2023-10-04 RX ADMIN — METRONIDAZOLE SCH MLS: 500 INJECTION, SOLUTION INTRAVENOUS at 17:30

## 2023-10-04 RX ADMIN — ACETAMINOPHEN PRN MG: 500 TABLET, FILM COATED ORAL at 08:59

## 2023-10-04 RX ADMIN — SODIUM CHLORIDE SCH MLS: 0.9 INJECTION, SOLUTION INTRAVENOUS at 00:38

## 2023-10-04 RX ADMIN — SODIUM CHLORIDE SCH MG: 0.9 INJECTION, SOLUTION INTRAVENOUS at 19:51

## 2023-10-04 NOTE — P.PN
Subjective


Date of Service: 10/04/23


Chief Complaint: GI bleeding


Patient states her abdominal pain is much better.


She reports bloody bowel movement this morning.


No nausea or vomiting today.


Patient has been afebrile.








Physical Examination





- Vital Signs


Temperature: 97.6 F


Blood Pressure: 100/61


Pulse: 91


Respirations: 14


Pulse Ox (%): 99





- Studies


Laboratory Data (last 24 hrs)











  10/03/23 10/03/23 10/03/23





  15:05 15:05 14:56


 


WBC   14.40 H 


 


Hgb   11.3 L 


 


Hct   33.8 L 


 


Plt Count   410 H 


 


Sodium  134 L  


 


Potassium  4.0  


 


BUN  9  


 


Creatinine  0.63  


 


Glucose  90  


 


Total Bilirubin  0.2  


 


AST  10 L  


 


ALT  18  


 


Alkaline Phosphatase  75  


 


Lipase    22











Assessment And Plan





- Plan


 Physical Exam


General: Alert, In no apparent distress, Oriented x3


Respiratory: Clear to auscultation bilaterally, Normal air movement, Diminished


Cardiovascular: No edema, Normal pulses, Regular rate/rhythm


Gastrointestinal: Normal bowel sounds, diffuse abdominal tenderness.


Integumentary: No rashes, No breakdown


Neurological: Normal gait, Normal speech, Normal strength at 5/5 x4 extr





Diagnosis


Pancolitis-infectious colitis versus inflammatory bowel disease.


Lower GI bleed-likely secondary to colitis


Leukocytosis


Rheumatoid arthritis


Osteoarthritis


Microcytic anemia








Pancolitis/Lower GI bleed


Recent rheumatoid arthritis diagnosis by her PCP


Given the presence of chronic colitis with lower GI bleed, inflammatory bowel 

disease highly suspected.


Patient was recently started on meloxicam and hydralazine chloroquine.


Suspect NSAIDs could have exacerbated an inflammatory bowel disease


GI consulted. 


Empiric IV Flagyl, Cipro


Antiemetics and analgesics as needed.


Leukocytosis resolved.





Hyponatremia


Resolved with IV normal saline.


Continue IV hydration





Rheumatoid arthritis


Osteoarthritis


Microcytic anemia


Trend H&H, as needed analgesics, avoid NSAID use





Diet clear liquids


Full code


DVT SCDs.  


Discharge Plan: Home

## 2023-10-05 LAB
ALBUMIN SERPL BCP-MCNC: 2.2 G/DL (ref 3.4–5)
ALP SERPL-CCNC: 45 U/L (ref 45–117)
ALT SERPL W P-5'-P-CCNC: 11 U/L (ref 13–56)
AST SERPL W P-5'-P-CCNC: < 4 U/L (ref 15–37)
BUN BLD-MCNC: 6 MG/DL (ref 7–18)
GLUCOSE SERPLBLD-MCNC: 97 MG/DL (ref 74–106)
HCT VFR BLD CALC: 26.1 % (ref 36–45)
LYMPHOCYTES # SPEC AUTO: 2.4 K/UL (ref 0.7–4.9)
MAGNESIUM SERPL-MCNC: 2.2 MG/DL (ref 1.6–2.4)
MCV RBC: 79.6 FL (ref 80–100)
PMV BLD: 7 FL (ref 7.6–11.3)
POTASSIUM SERPL-SCNC: 3.5 MEQ/L (ref 3.5–5.1)
RBC # BLD: 3.28 M/UL (ref 3.86–4.86)
WBC # BLD AUTO: 8.4 THOU/UL (ref 4.3–10.9)

## 2023-10-05 PROCEDURE — 0DB68ZX EXCISION OF STOMACH, VIA NATURAL OR ARTIFICIAL OPENING ENDOSCOPIC, DIAGNOSTIC: ICD-10-PCS

## 2023-10-05 PROCEDURE — 0DBP8ZX EXCISION OF RECTUM, VIA NATURAL OR ARTIFICIAL OPENING ENDOSCOPIC, DIAGNOSTIC: ICD-10-PCS

## 2023-10-05 PROCEDURE — 0DBN8ZX EXCISION OF SIGMOID COLON, VIA NATURAL OR ARTIFICIAL OPENING ENDOSCOPIC, DIAGNOSTIC: ICD-10-PCS

## 2023-10-05 PROCEDURE — 0DB78ZX EXCISION OF STOMACH, PYLORUS, VIA NATURAL OR ARTIFICIAL OPENING ENDOSCOPIC, DIAGNOSTIC: ICD-10-PCS

## 2023-10-05 PROCEDURE — 0DBM8ZX EXCISION OF DESCENDING COLON, VIA NATURAL OR ARTIFICIAL OPENING ENDOSCOPIC, DIAGNOSTIC: ICD-10-PCS

## 2023-10-05 PROCEDURE — 0DBL8ZX EXCISION OF TRANSVERSE COLON, VIA NATURAL OR ARTIFICIAL OPENING ENDOSCOPIC, DIAGNOSTIC: ICD-10-PCS

## 2023-10-05 RX ADMIN — CEFTRIAXONE SCH: 1 INJECTION, POWDER, FOR SOLUTION INTRAMUSCULAR; INTRAVENOUS at 09:00

## 2023-10-05 RX ADMIN — Medication SCH: at 09:00

## 2023-10-05 RX ADMIN — METRONIDAZOLE SCH MLS: 500 INJECTION, SOLUTION INTRAVENOUS at 15:50

## 2023-10-05 RX ADMIN — METRONIDAZOLE SCH MLS: 500 INJECTION, SOLUTION INTRAVENOUS at 01:16

## 2023-10-05 RX ADMIN — ONDANSETRON PRN MG: 2 INJECTION INTRAMUSCULAR; INTRAVENOUS at 23:47

## 2023-10-05 RX ADMIN — METRONIDAZOLE SCH: 500 INJECTION, SOLUTION INTRAVENOUS at 09:00

## 2023-10-05 RX ADMIN — ACETAMINOPHEN PRN MG: 500 TABLET, FILM COATED ORAL at 23:50

## 2023-10-05 RX ADMIN — CEFTRIAXONE SCH MLS: 1 INJECTION, POWDER, FOR SOLUTION INTRAMUSCULAR; INTRAVENOUS at 17:18

## 2023-10-05 RX ADMIN — SODIUM CHLORIDE SCH: 0.9 INJECTION, SOLUTION INTRAVENOUS at 09:00

## 2023-10-05 RX ADMIN — Medication SCH ML: at 21:21

## 2023-10-05 RX ADMIN — SODIUM CHLORIDE SCH MG: 0.9 INJECTION, SOLUTION INTRAVENOUS at 21:21

## 2023-10-05 RX ADMIN — METRONIDAZOLE SCH MLS: 500 INJECTION, SOLUTION INTRAVENOUS at 21:21

## 2023-10-05 NOTE — CON
Date of Consultation:  10/05/2023



Reason For Consultation:  Moderate-to-severe pancolitis, unexplained with right and left lower quadra
nt pain and midepigastric greater than right upper quadrant and left upper quadrant pain, nausea, vom
iting, bloody diarrhea.



History Of Present Illness:  The patient is a 23-year-old  female with recent diagnosis of rh
eumatoid osteoarthritis, presented to the hospital with bloody diarrhea and right and left lower quad
rant pain and midepigastric right and left upper quadrant pain as well.  Pain she states is approxima
tely maximum 9/10, currently 5/10.  Associated with nausea, vomiting, and diarrhea.  She reports 10 t
o 20 watery stools per day.  Since hospitalization, she has decreased to possibly 3 to 6 stools per d
ay.  However, the pain seems to persist, though it has improved some from a 9/10 to 5/10.  She also r
eports hematochezia over the past week with chills and night sweats.  CT reveals moderate-to-severe p
ancolitis.  The patient states that she has had lower back pain from her lumbar spine ever since she 
was 10 years old and is unexplained.  Her mother also has a history of chronic low back pain, is unex
plained as well.  She recently moved back from [place], [place].  She had been attending the [employer] and started working there, but decided to come back home to take care of her ailing gra
ndparents in December 2022.  She began having hip pain bilaterally, right greater than left, on Septe
mber 10, 2023.  The pain became so severe.  She went to the [facility] on September 16, 202
3.  Workup was negative; however, she was given a shot of muscle relaxer and also a shot of what look
s like Toradol and also given pills, which was Ultram, naproxen, and tizanidine.  She went to her PCP
 for followup and was diagnosed with rheumatoid and osteoarthritis approximately 2 weeks ago.  It guille
ears that these medicines did help with the right and left hip pain.  However, she began having abdom
inal pain after starting the medicines, so she says she went to the emergency room on the 16th.  She 
saw her primary care doctor on September 18th, was diagnosed with rheumatoid and osteoarthritis.  She
 started her medicine on the 22nd, which was naproxen.  PCP stopped the naproxen and put her on melox
icam, which she started on September 22nd, and then later on she started the Plaquenil.  She never di
d start the methotrexate that was also prescribed, she reports.  From the 22nd, she began having the 
pain with the start of these medicines, and that being the meloxicam and the Plaquenil.  Finally on S
unday, October 1st, she stopped the medicines due to the severity of her pain and she came to the [facility] on October 3rd, two days ago now.  She reports having hematochezia x1 week, wh
ich began after the abdominal pain began.  She does not have any hemorrhoid 2 months ago, which she t
ook medicines and that is when these hemorrhoids seem to have resolved, but now she wondered if the b
leeding is from the hemorrhoids or from her colon.  She also reports chills and night sweats.  Maybe 
some slight fevers along with the pain and nausea, vomiting, diarrhea.  She also notes a long history
 of heartburn and bloating.



Past Medical History:  Significant for chronic low back pain since the age of 10, allergies, asthma, 
tonsillectomy at the age of 2, heartburn, and bloating.



Medications:  At home include meloxicam and Plaquenil.  She was prescribed methotrexate, never took i
t.  It appears that she did not take the Ultram.  She has stopped the naproxen.  Tizanidine, she had 
stopped as well.



Allergies:  NKDA.



Review of Systems:

The patient had midepigastric, greater than right upper quadrant and left upper quadrant pain, greate
r than right and left lower quadrant pain, 9/10 maximum, now down to 5/10; associated with nausea, vo
miting, bloody diarrhea; 10 to 20 bowel movements at maximum, they were watery with bright red blood 
to dark blood, now down to 3 to 6 stools per day here in the hospital, associated with chills and nig
ht sweats.  Of note, the blood has been present for approximately 1 week.  The patient denies any hem
atemesis, coffee ground emesis, though she does have heartburn, bloating.  The pain seems to be worse
 in the abdomen every time she eats, so she has postprandial pain.  She denies any chest pain, shortn
ess of breath, seizure, syncope, muscle aches.  She does have joint aches with osteo rheumatoid arthr
itis.  Some slight decreased mood, but no other issues.  No epistaxis.  No hemoptysis, hematuria, dys
uria, polyuria, polydipsia, or other.



Social History:  She is single, never .  No children.  No tobacco.  She states she vaped for a
pproximately 1 year, ending 1 year ago.  Alcohol:  She has been off for 1 year.  In the past, she wou
ld drink a beer occasionally.



Family History:  Father is alive.  She does not know his medical history, seems to be estranged from 
him.  Mother is alive with hypertension and chronic low back pain.  Maternal Grandmother with non-Hod
gkin lymphoma.  Maternal Aunt with breast cancer.



Physical Examination:

Vital Signs:  The patient is 5 feet 2 inches, 113 pounds, BMI 20.7 kg/m2.  Temperature 98.8 degrees F
ahrenheit, pulse 60, respirations 15, blood pressure 110/62, O2 saturation 99%. 

General:  She is a well-nourished, well-developed female, lying in bed, in no acute distress. 

HEENT:  Normocephalic, atraumatic.  Anicteric.  Pupils equal, round, and reactive to light.  Extraocu
lar movements are intact.  Oropharynx is clear. 

Neck:  Supple.  No masses. 

Respirations:  Clear to auscultation bilaterally. 

Cardiac:  Regular rate and rhythm.  No gallops or rubs. 

Abdomen:  Positive bowel sounds.  Soft, nondistended.  Pain in the midepigastric, greater than right 
and left upper quadrants and right and left lower quadrant areas.  No peritoneal sign, but there was 
guarding.  No rebound.  No hepatosplenomegaly. 

Extremities:  No clubbing, cyanosis, or edema.  2+ pulses. 

Neuro:  Alert and oriented x3.  Grossly nonfocal.  5/5 motor strength.  Sensation intact to light marcelo
ch.



Laboratory Data:  The patient had a white count of 8.4 today, down from 14.4 on admission; hemoglobin
 of 8.8, down from 11.3; hematocrit 26.1; platelet count 333, down from 410 on admission.  Polys of 6
1%, down from 88% yesterday; lymphocytes 28%; monocytes 10%; eosinophils 0.5%; basophils 0.4%.  Sodiu
m 141, potassium 3.5, chloride 114, bicarb 24, BUN of 6, creatinine of 0.53, glucose 97.  Calcium 7.6
, magnesium 2.2.  Total bilirubin 0.1.  AST of less than 4, ALT of 11, alkaline phosphatase 45.  Tota
l protein 5.5, albumin 2.2, lipase 22.  Trace ketones, otherwise negative UA.  Negative pregnancy yariel
t.  CT abdomen and pelvis revealed moderately severe diffuse pancolitis, which could be due to inflam
matory/infectious etiologies or other.  Also, mildly prominent lymph node in the mesocolon, this was 
limited and reactive in nature, but the inflammation in the colon was moderate to severe throughout t
he colon with the appendix involved as well, likely representing colitis.



Impression:  

1.Moderate-to-severe pancolitis, most likely secondary to infectious versus inflammatory etiology fernandez
ch as ulcerative colitis or other.  The patient had 9/10 pain maximum, now down to 5/10.  Midepigastr
ic, greater than right upper quadrant and left upper quadrant, greater than right and left lower quad
rant pain, associated with bloody diarrhea, 10 to 20 bowel movements a day, down to 3 to 6 with IV an
tibiotics and IV fluids in hospital.  Chills, night sweats, hematochezia for the past week, and nause
a and vomiting.  Appears to have started after she started taking meloxicam and Plaquenil for new yun
gnosis of rheumatoid arthritis made by PCP on September 18, 2023.  The patient stopped taking these m
edicines on October 1st, but her symptoms persisted, so it could be from a reaction to a medication s
uch as intolerance to inflammatory bowel disease or could be infectious in etiology.  The patient ac
tained an infectious agent from her environment for some reason, or other.

2.Heartburn and bloating for many years.  We will need to investigate with EGD as well and also the 
postprandial midepigastric pain, investigate with EGD.

3.History of chronic low back pain, also Mother with low back pain chronically for many years.

4.Also history of allergies, asthma, and tonsillectomy at the age of 2.



Recommendations:  

1.Continue IV fluids and check stool studies.

2.Continue IV antibiotics.

3.P.r.n. pain medicines, antiemetics.

4.Check IBD panel.

5.Colonoscopy, EGD.

6.Consider steroids in this patient.





LILI

DD:  10/05/2023 09:14:58Voice ID:  [id number]

DT:  10/05/2023 10:40:34Report ID:  [id number]

## 2023-10-05 NOTE — P.PN
Subjective


Date of Service: 10/05/23


Chief Complaint: GI bleeding


Patient reports multiple bouts of diarrhea this morning.  She states that she 

has not seen any blood today.


She is complaining of abdominal pain.


No nausea or vomiting today.


Patient has been afebrile.








Physical Examination





- Vital Signs


Temperature: 97.4 F


Blood Pressure: 101/70


Pulse: 86


Respirations: 18


Pulse Ox (%): 99





Assessment And Plan





- Plan


 Physical Exam


General: Alert, In no apparent distress, Oriented x3


Respiratory: Clear to auscultation bilaterally, Normal air movement, Diminished


Cardiovascular: No edema, Normal pulses, Regular rate/rhythm


Gastrointestinal: Normal bowel sounds, diffuse abdominal tenderness.


Integumentary: No rashes, No breakdown


Neurological: Normal gait, Normal speech, Normal strength at 5/5 x4 extr





Diagnosis


Pancolitis-infectious colitis versus inflammatory bowel disease.


Lower GI bleed-likely secondary to colitis


Leukocytosis


Rheumatoid arthritis


Osteoarthritis


Microcytic anemia








Pancolitis/Lower GI bleed


Recent rheumatoid arthritis diagnosis by her PCP


Given the presence of chronic colitis with lower GI bleed, inflammatory bowel 

disease highly suspected.


Patient was recently started on meloxicam and hydralazine chloroquine.


Suspect NSAIDs could have exacerbated an inflammatory bowel disease


GI consulted. 


Patient is planned for EGD and colonoscopy


Empiric IV Flagyl, Cipro for possible infectious colitis


Antiemetics and analgesics as needed.


Leukocytosis resolved.





Acute blood loss anemia


Secondary to GI bleed


Monitor and transfuse as needed for hemoglobin less than 7.





Hyponatremia


Resolved with IV normal saline.


Continue IV hydration





Rheumatoid arthritis


Osteoarthritis


Microcytic anemia


Trend H&H, as needed analgesics, avoid NSAID use





Diet : N.p.o. for EGD and colonoscopy


Full code


DVT SCDs.  


Discharge Plan: Home

## 2023-10-05 NOTE — RAD REPORT
EXAM DESCRIPTION:  RAD - Small Bowel Series - 10/5/2023 2:23 pm

 

CLINICAL HISTORY:  possible Crohn's disease

Abdominal pain

 

COMPARISON:  Abdomen   Pelvis W Contrast dated 10/3/2023

 

FINDINGS:   film shows a nonspecific bowel gas pattern. No obstruction or free air. No suspiciou
s calcifications.

 

Gastric size and mucosal fold pattern are normal. No delay in transit of contrast into the small enrique
l. Small bowel is normal in diameter with no mucosal fold thickening. No intrinsic or extrinsic mass 
identifiable. Terminal ileum has normal appearance. Transit time to the colon is normal.

 

No fluoroscopy was performed.

Total images acquired:  7

 

IMPRESSION:  Normal small bowel series.

## 2023-10-06 LAB
BUN BLD-MCNC: 4 MG/DL (ref 7–18)
GLUCOSE SERPLBLD-MCNC: 123 MG/DL (ref 74–106)
HCT VFR BLD CALC: 29.1 % (ref 36–45)
LYMPHOCYTES # SPEC AUTO: 1.2 K/UL (ref 0.7–4.9)
MAGNESIUM SERPL-MCNC: 2.3 MG/DL (ref 1.6–2.4)
MCV RBC: 78.8 FL (ref 80–100)
PMV BLD: 6.7 FL (ref 7.6–11.3)
POTASSIUM SERPL-SCNC: 3.9 MEQ/L (ref 3.5–5.1)
RBC # BLD: 3.69 M/UL (ref 3.86–4.86)
WBC # BLD AUTO: 13.5 THOU/UL (ref 4.3–10.9)

## 2023-10-06 RX ADMIN — METRONIDAZOLE SCH MLS: 500 INJECTION, SOLUTION INTRAVENOUS at 22:23

## 2023-10-06 RX ADMIN — METRONIDAZOLE SCH MLS: 500 INJECTION, SOLUTION INTRAVENOUS at 06:18

## 2023-10-06 RX ADMIN — Medication SCH: at 21:00

## 2023-10-06 RX ADMIN — CEFTRIAXONE SCH MLS: 1 INJECTION, POWDER, FOR SOLUTION INTRAMUSCULAR; INTRAVENOUS at 08:31

## 2023-10-06 RX ADMIN — POTASSIUM CHLORIDE ONE MEQ: 10 TABLET, FILM COATED, EXTENDED RELEASE ORAL at 08:31

## 2023-10-06 RX ADMIN — Medication SCH: at 08:31

## 2023-10-06 RX ADMIN — POTASSIUM CHLORIDE ONE: 10 TABLET, FILM COATED, EXTENDED RELEASE ORAL at 09:00

## 2023-10-06 RX ADMIN — METHYLPREDNISOLONE SODIUM SUCCINATE SCH MLS: 40 INJECTION, POWDER, FOR SOLUTION INTRAMUSCULAR; INTRAVENOUS at 16:23

## 2023-10-06 RX ADMIN — METRONIDAZOLE SCH MLS: 500 INJECTION, SOLUTION INTRAVENOUS at 16:22

## 2023-10-06 RX ADMIN — ONDANSETRON PRN MG: 2 INJECTION INTRAMUSCULAR; INTRAVENOUS at 22:20

## 2023-10-06 RX ADMIN — SODIUM CHLORIDE SCH MG: 0.9 INJECTION, SOLUTION INTRAVENOUS at 08:31

## 2023-10-06 RX ADMIN — SODIUM CHLORIDE SCH MG: 0.9 INJECTION, SOLUTION INTRAVENOUS at 22:01

## 2023-10-06 NOTE — P.PN
Subjective


Date of Service: 10/06/23


Chief Complaint: GI bleeding


Patient reports tenesmus today.  No blood in stool


She is complaining of abdominal pain.


Status post EGD and colonoscopy yesterday


No recorded fever.








Physical Examination





- Vital Signs


Temperature: 98.6 F


Blood Pressure: 105/64


Pulse: 77


Respirations: 12


Pulse Ox (%): 98





Assessment And Plan





- Plan


 Physical Exam


General: Alert, In no apparent distress, Oriented x3


Respiratory: Clear to auscultation bilaterally, Normal air movement, Diminished


Cardiovascular: No edema, Normal pulses, Regular rate/rhythm


Gastrointestinal: Normal bowel sounds, diffuse abdominal tenderness.


Integumentary: No rashes, No breakdown


Neurological: Normal gait, Normal speech, Normal strength at 5/5 x4 extr





Diagnosis


Pancolitis-infectious colitis versus inflammatory bowel disease.


Lower GI bleed-likely secondary to colitis


Leukocytosis


Rheumatoid arthritis


Osteoarthritis


Microcytic anemia








Pancolitis/Lower GI bleed


Recent rheumatoid arthritis diagnosis by her PCP


Given the presence of chronic colitis with lower GI bleed, inflammatory bowel 

disease highly suspected.


Patient was recently started on meloxicam and hydralazine chloroquine.


Suspect NSAIDs could have exacerbated an inflammatory bowel disease


GI consulted.  EGD and colonoscopy done


EGD demonstrated small hiatal hernia and gastric erosions.


Colonoscopy showed areas of inflammation with ulcerations.  Ulcerative colitis 

is highly likely.


Continue empiric IV Flagyl, Cipro for possible infectious colitis


Patient started on IV methylprednisone


Clear liquid diet


Supportive measures antiemetics and analgesics as needed.


Leukocytosis resolved.





Acute blood loss anemia


Secondary to GI bleed


Monitor and transfuse as needed for hemoglobin less than 7.





Hyponatremia


Resolved with IV normal saline.


Continue IV hydration





Rheumatoid arthritis


Osteoarthritis


Microcytic anemia


Trend H&H, as needed analgesics, avoid NSAID use





Full code


DVT SCDs.  


Discharge Plan: Anticipating D/C home by tomorrow

## 2023-10-07 VITALS — OXYGEN SATURATION: 98 %

## 2023-10-07 LAB
BUN BLD-MCNC: 6 MG/DL (ref 7–18)
GLUCOSE SERPLBLD-MCNC: 130 MG/DL (ref 74–106)
HCT VFR BLD CALC: 29.8 % (ref 36–45)
LYMPHOCYTES # SPEC AUTO: 1.9 K/UL (ref 0.7–4.9)
MCV RBC: 78.9 FL (ref 80–100)
PMV BLD: 6.7 FL (ref 7.6–11.3)
POTASSIUM SERPL-SCNC: 5 MEQ/L (ref 3.5–5.1)
RBC # BLD: 3.78 M/UL (ref 3.86–4.86)
WBC # BLD AUTO: 14.2 THOU/UL (ref 4.3–10.9)

## 2023-10-07 RX ADMIN — SODIUM CHLORIDE SCH MG: 0.9 INJECTION, SOLUTION INTRAVENOUS at 20:32

## 2023-10-07 RX ADMIN — METRONIDAZOLE SCH MLS: 500 INJECTION, SOLUTION INTRAVENOUS at 06:37

## 2023-10-07 RX ADMIN — SODIUM CHLORIDE SCH MG: 0.9 INJECTION, SOLUTION INTRAVENOUS at 07:39

## 2023-10-07 RX ADMIN — METRONIDAZOLE SCH MLS: 500 INJECTION, SOLUTION INTRAVENOUS at 14:45

## 2023-10-07 RX ADMIN — FAMOTIDINE SCH MG: 10 INJECTION, SOLUTION INTRAVENOUS at 18:17

## 2023-10-07 RX ADMIN — Medication SCH: at 20:32

## 2023-10-07 RX ADMIN — ONDANSETRON PRN MG: 2 INJECTION INTRAMUSCULAR; INTRAVENOUS at 22:16

## 2023-10-07 RX ADMIN — SODIUM CHLORIDE SCH MLS: 0.9 INJECTION, SOLUTION INTRAVENOUS at 17:48

## 2023-10-07 RX ADMIN — METHYLPREDNISOLONE SODIUM SUCCINATE SCH MLS: 40 INJECTION, POWDER, FOR SOLUTION INTRAMUSCULAR; INTRAVENOUS at 15:27

## 2023-10-07 RX ADMIN — SODIUM CHLORIDE SCH MLS: 0.9 INJECTION, SOLUTION INTRAVENOUS at 22:16

## 2023-10-07 RX ADMIN — Medication SCH: at 07:40

## 2023-10-07 RX ADMIN — CEFTRIAXONE SCH MLS: 1 INJECTION, POWDER, FOR SOLUTION INTRAMUSCULAR; INTRAVENOUS at 07:40

## 2023-10-07 RX ADMIN — METRONIDAZOLE SCH MLS: 500 INJECTION, SOLUTION INTRAVENOUS at 22:15

## 2023-10-07 NOTE — P.PN
Subjective


Date of Service: 10/07/23


Chief Complaint: GI bleeding


Patient abdominal pain and diarrhea frequency is better.


Status post EGD and colonoscopy 10/5.











Physical Examination





- Vital Signs


Temperature: 97.9 F


Blood Pressure: 113/70


Pulse: 70


Respirations: 16


Pulse Ox (%): 99





Assessment And Plan





- Plan


 Physical Exam


General: Alert, In no apparent distress, Oriented x3


Respiratory: Clear to auscultation bilaterally, Normal air movement, Diminished


Cardiovascular: No edema, Normal pulses, Regular rate/rhythm


Gastrointestinal: Normal bowel sounds, diffuse abdominal tenderness.


Integumentary: No rashes, No breakdown


Neurological: Normal gait, Normal speech, Normal strength at 5/5 x4 extr





Diagnosis


Pancolitis-infectious colitis versus inflammatory bowel disease.


Lower GI bleed-likely secondary to colitis


Leukocytosis


Rheumatoid arthritis


Osteoarthritis


Microcytic anemia








Pancolitis/Lower GI bleed


Recent rheumatoid arthritis diagnosis by her PCP


Given the presence of chronic colitis with lower GI bleed, inflammatory bowel 

disease highly suspected.


Patient was recently started on meloxicam and hydralazine chloroquine.


Suspect NSAIDs could have exacerbated an inflammatory bowel disease


GI consulted.  EGD and colonoscopy done


EGD demonstrated small hiatal hernia and gastric erosions.


Colonoscopy showed areas of inflammation with ulcerations.  Ulcerative colitis 

is highly likely.


Continue empiric IV Flagyl, Cipro for possible infectious colitis


Continue IV methylprednisone and transition to oral prednisone on discharge.


Advance diet as tolerated.


Supportive measures-antiemetics and analgesics as needed.


Leukocytosis resolved.





Acute blood loss anemia


Secondary to GI bleed


Hemoglobin has been stable


Monitor and transfuse as needed for hemoglobin less than 7.





Hyponatremia


Resolved with IV normal saline.


Continue IV hydration





Rheumatoid arthritis


Osteoarthritis


Microcytic anemia


Trend H&H, as needed analgesics, avoid NSAID use





Full code


DVT SCDs.

## 2023-10-07 NOTE — P.PN
Subjective


Date of Service: 10/06/23


Chief Complaint: Bloody diarrhea, N/V, LILLIAN>RUQ/LUQ/RLQ/LLQ pain, abn CT abd/pel,

pancolitis


Subjective: Improving





Review of Systems


General: Weakness (Improved.), Malaise (Improved.)


Gastrointestinal: Nausea (Improved.), Abdominal Pain (Improved.)





Physical Examination





- Vital Signs


Temperature: 97.7 F


Blood Pressure: 115/71


Pulse: 71


Respirations: 16


Pulse Ox (%): 99





- Physical Exam


General: Alert, In no apparent distress, Oriented x3, Cooperative


HEENT: Atraumatic, Normocephalic, PERRLA, EOMI


Neck: Supple


Respiratory: Normal air movement


Cardiovascular: Normal pulses


Gastrointestinal: No rebound, Tenderness (Mild )


Neurological: Normal speech, Normal strength at 5/5 x4 extr





Assessment And Plan





- Current Problems (Diagnosis)


(1) Hematochezia


Current Visit: Yes   Status: Acute   Comment: Improved.   





(2) Change in bowel habits


Current Visit: Yes   Status: Acute   





(3) Diarrhea


Current Visit: Yes   Status: Acute   





(4) Epigastric abdominal pain


Current Visit: Yes   Status: Acute   





(5) RLQ abdominal pain


Current Visit: Yes   Status: Acute   





(6) LLQ abdominal pain


Current Visit: Yes   Status: Acute   





(7) Abnormal CT of the abdomen


Current Visit: Yes   Status: Acute   





(8) Pancolitis


Current Visit: Yes   Status: Acute   





- Plan


REC: 1) IV Solumedrol drip at 40 mg per day 


2) CL diet 


3) continue IV antibiotics 


4) await remaining stool studies 


5) small bowel series 


6) outpatient pillcam 


7) await IBD panel 





Physician Review: Patient Assessed, Agree with Above Assessment and Plan

## 2023-10-07 NOTE — P.PN
Subjective


Date of Service: 10/07/23


Chief Complaint: Bloody diarrhea, N/V, LILLIAN>RUQ/LUQ/RLQ/LLQ pain, abn CT abd/pel,

pancolitis


Subjective: Improving (Felt 80% better yesterday after start of IV Solumedrol.  

Had FLs last night with increased abdominal pain and with GI soft diet as well. 

Hyponatremia today with Na 130 and K 5 (IV NS started by PCP).  Good urination. 

Limited bowel movements.  States she still thinks she is improving.)





Physical Examination





- Vital Signs


Temperature: 97.7 F


Blood Pressure: 115/71


Pulse: 71


Respirations: 16


Pulse Ox (%): 99





Assessment And Plan





- Current Problems (Diagnosis)


(1) Hematochezia


Current Visit: Yes   Status: Acute   Comment: Improved.   





(2) Change in bowel habits


Current Visit: Yes   Status: Acute   





(3) Diarrhea


Current Visit: Yes   Status: Acute   





(4) Epigastric abdominal pain


Current Visit: Yes   Status: Acute   





(5) RLQ abdominal pain


Current Visit: Yes   Status: Acute   





(6) LLQ abdominal pain


Current Visit: Yes   Status: Acute   





(7) Abnormal CT of the abdomen


Current Visit: Yes   Status: Acute   





(8) Pancolitis


Current Visit: Yes   Status: Acute   





- Plan


REC: 1) IV Solumedrol drip at 40 mg per day 


2) CL diet 


3) continue IV antibiotics 


4) await remaining stool studies 


5) await colonoscopy pathology  


6) outpatient pillcam 


7) await IBD panel 


8) consider biologic therapy soon as outpatient for possible UC/IBD 





Physician Review: Patient Assessed, Agree with Above Assessment and Plan

## 2023-10-08 VITALS — SYSTOLIC BLOOD PRESSURE: 108 MMHG | TEMPERATURE: 98.7 F | DIASTOLIC BLOOD PRESSURE: 71 MMHG

## 2023-10-08 LAB
BUN BLD-MCNC: 9 MG/DL (ref 7–18)
GLUCOSE SERPLBLD-MCNC: 141 MG/DL (ref 74–106)
HCT VFR BLD CALC: 30 % (ref 36–45)
LYMPHOCYTES # SPEC AUTO: 1.8 K/UL (ref 0.7–4.9)
MAGNESIUM SERPL-MCNC: 2.2 MG/DL (ref 1.6–2.4)
MCV RBC: 79.7 FL (ref 80–100)
PMV BLD: 6.8 FL (ref 7.6–11.3)
POTASSIUM SERPL-SCNC: 4.2 MEQ/L (ref 3.5–5.1)
RBC # BLD: 3.77 M/UL (ref 3.86–4.86)
WBC # BLD AUTO: 15.3 THOU/UL (ref 4.3–10.9)

## 2023-10-08 RX ADMIN — Medication SCH: at 07:25

## 2023-10-08 RX ADMIN — SODIUM CHLORIDE SCH: 0.9 INJECTION, SOLUTION INTRAVENOUS at 08:00

## 2023-10-08 RX ADMIN — CEFTRIAXONE SCH MLS: 1 INJECTION, POWDER, FOR SOLUTION INTRAMUSCULAR; INTRAVENOUS at 07:24

## 2023-10-08 RX ADMIN — FAMOTIDINE SCH MG: 10 INJECTION, SOLUTION INTRAVENOUS at 07:24

## 2023-10-08 RX ADMIN — METRONIDAZOLE SCH MLS: 500 INJECTION, SOLUTION INTRAVENOUS at 06:12

## 2023-10-08 RX ADMIN — ONDANSETRON PRN MG: 2 INJECTION INTRAMUSCULAR; INTRAVENOUS at 04:49

## 2023-10-08 RX ADMIN — SODIUM CHLORIDE SCH MG: 0.9 INJECTION, SOLUTION INTRAVENOUS at 07:25

## 2023-10-08 NOTE — P.DS
Admission Date: 10/03/23


Discharge Date: 10/08/23


Disposition: ROUTINE DISCHARGE


Discharge Condition: FAIR


Reason for Admission: Bloody diarrhea, N/V, LILLIAN>RUQ/LUQ/RLQ/LLQ pain, abn CT 

abd/pel, pancolitis


Brief History of Present Illness: 


23-year-old  female with a past medical history of rheumatoid arthritis,

osteoarthritis presents to the emergency room with rectal bleeding.  She 

reported lower quadrant abdominal pain that started 7 days prior.  She reports 

associated bright red blood with each bowel movement.  She reported starting new

medication from rheumatology for RA.  She was taking meloxicam 15 mg, 

hydroxychloroquine.  She denied fever, nausea, vomiting,hematemesis. Laboratory 

evaluation leukocytosis WBCs 14.40, no left shift, microcytic anemia hemoglobin 

11.3, 33.8, platelets elevated at 410, mild hyponatremia 134, UA negative.  CT 

of the abdomen pelvis showed moderately severe diffuse pancolitis, suggesting 

inflammatory or infectious origin.  Patient was hospitalized for further 

management.





Hospital Course: 


Diagnosis


Pancolitis-infectious colitis versus inflammatory bowel disease.


Lower GI bleed-likely secondary to colitis


Leukocytosis


Rheumatoid arthritis


Osteoarthritis


Microcytic anemia





Patient was admitted to the medical floor and the following medical problems 

addressed:





Pancolitis/Lower GI bleed


Recent rheumatoid arthritis diagnosis by her PCP


Given the presence of chronic colitis with lower GI bleed, inflammatory bowel 

disease highly suspected.


Patient was recently started on meloxicam and hydroxychloroquine and 

methotrexate.  She has not taken the methotrexate yet.


Suspect NSAIDs could have exacerbated an inflammatory bowel disease


GI consulted.  EGD and colonoscopy done


EGD demonstrated small hiatal hernia and gastric erosions.


Colonoscopy showed areas of inflammation with ulcerations.  Ulcerative colitis 

is highly likely.


Patient treated with IV Flagyl and Cipro for possible infectious colitis


Later started IV methylprednisone for ulcerative colitis. and transition to oral

prednisone on discharge.


Her diet was advanced which she tolerated.


Bowel frequency, tenesmus and diarrhea have resolved.  Rectal bleeding was also 

resolved.


IV methylprednisone transition to oral prednisone on discharge.


Patient informed to follow-up with Dr. Carey for further management of possible

ulcerative colitis.


She has been informed to avoid NSAID including the meloxicam.  She can continue 

the methotrexate and hydroxychloroquine.





Acute blood loss anemia


Secondary to GI bleed


Hemoglobin was stable





Hyponatremia


Resolved with IV normal saline.





Rheumatoid arthritis


Methotrexate and hydroxychloroquine resumed on discharge.








Vital Signs/Physical Exam: 














Temp Pulse Resp BP Pulse Ox


 


 98.7 F   62   16   108/71   100 


 


 10/08/23 08:00  10/08/23 08:00  10/08/23 08:00  10/08/23 08:00  10/08/23 08:00








General: Alert, In no apparent distress, Oriented x3


HEENT: Mucous membr. moist/pink


Neck: Supple, JVD not distended


Respiratory: Clear to auscultation bilaterally, Normal air movement


Cardiovascular: No edema, Regular rate/rhythm, Normal S1 S2


Gastrointestinal: Normal bowel sounds, Soft and benign, Non-distended, No 

tenderness


Musculoskeletal: No swelling, No tenderness


Integumentary: No rashes, No cyanosis


Neurological: Normal strength at 5/5 x4 extr


Laboratory Data at Discharge: 














WBC  15.30 thou/uL (4.3-10.9)  H  10/08/23  04:03    


 


Hgb  10.0 g/dL (12.0-15.0)  L  10/08/23  04:03    


 


Hct  30.0 % (36.0-45.0)  L  10/08/23  04:03    


 


Plt Count  460 thou/uL (152-406)  H  10/08/23  04:03    


 


Sodium  139 mEq/L (136-145)  D 10/08/23  04:03    


 


Potassium  4.2 mEq/L (3.5-5.1)  D 10/08/23  04:03    


 


BUN  9 mg/dL (7-18)   10/08/23  04:03    


 


Creatinine  0.51 mg/dL (0.55-1.02)  L  10/08/23  04:03    


 


Glucose  141 mg/dL ()  H  10/08/23  04:03    


 


Phosphorus  2.7 mg/dL (2.5-4.9)   10/08/23  04:03    


 


Magnesium  2.2 mg/dL (1.6-2.4)   10/08/23  04:03    


 


Total Bilirubin  0.1 mg/dL (0.2-1.0)  L  10/05/23  05:48    


 


AST  < 4 U/L (15-37)  L  10/05/23  05:48    


 


ALT  11 U/L (13-56)  L  10/05/23  05:48    


 


Alkaline Phosphatase  45 U/L ()  D 10/05/23  05:48    


 


Lipase  22 U/L (13-75)   10/03/23  14:56    








Home Medications: 








Hydroxychloroquine Sulfate 200 mg PO BID 10/04/23 


Amox/Clavulanate [Augmentin 875-125 Tab] 875 mg PO BID #10 tab 10/08/23 


Methotrexate [Methotrexate*] 2.5 mg PO EVERY 7TH DAY #4 tab 10/08/23 


Metronidazole 500 mg PO TID #15 tab 10/08/23 


predniSONE [Deltasone] 40 mg PO DAILY #14 tab 10/08/23 





New Medications: 


Amox/Clavulanate [Augmentin 875-125 Tab] 875 mg PO BID #10 tab


Methotrexate [Methotrexate*] 2.5 mg PO EVERY 7TH DAY #4 tab


Metronidazole 500 mg PO TID #15 tab


predniSONE [Deltasone] 40 mg PO DAILY #14 tab


Physician Discharge Instructions: 


Progress from soft diet to solid diet as tolerated.


Activity: Ad azael


Followup: 


Aakash Lopez MD [ASSOCIATE-ACTIVE - CAN ADMIT] - 1-2 Weeks (call to 

schedule an appoitnment)


KAYLA ABDALLA [Primary Care Provider] - 1-2 Weeks (call to schedule an 

appointment)


Time spent managing pt's care (in minutes): 38